# Patient Record
Sex: FEMALE | Race: WHITE | NOT HISPANIC OR LATINO
[De-identification: names, ages, dates, MRNs, and addresses within clinical notes are randomized per-mention and may not be internally consistent; named-entity substitution may affect disease eponyms.]

---

## 2017-01-24 ENCOUNTER — APPOINTMENT (OUTPATIENT)
Dept: OTOLARYNGOLOGY | Facility: CLINIC | Age: 27
End: 2017-01-24

## 2017-01-30 ENCOUNTER — OUTPATIENT (OUTPATIENT)
Dept: OUTPATIENT SERVICES | Facility: HOSPITAL | Age: 27
LOS: 1 days | End: 2017-01-30

## 2017-01-30 VITALS
OXYGEN SATURATION: 98 % | DIASTOLIC BLOOD PRESSURE: 70 MMHG | TEMPERATURE: 99 F | RESPIRATION RATE: 16 BRPM | HEIGHT: 62 IN | SYSTOLIC BLOOD PRESSURE: 112 MMHG | WEIGHT: 173.94 LBS | HEART RATE: 68 BPM

## 2017-01-30 DIAGNOSIS — J35.01 CHRONIC TONSILLITIS: ICD-10-CM

## 2017-01-30 DIAGNOSIS — Z98.890 OTHER SPECIFIED POSTPROCEDURAL STATES: Chronic | ICD-10-CM

## 2017-01-30 LAB
APTT BLD: 31.2 SEC — SIGNIFICANT CHANGE UP (ref 27.5–37.4)
BUN SERPL-MCNC: 8 MG/DL — SIGNIFICANT CHANGE UP (ref 7–23)
CALCIUM SERPL-MCNC: 9.3 MG/DL — SIGNIFICANT CHANGE UP (ref 8.4–10.5)
CHLORIDE SERPL-SCNC: 103 MMOL/L — SIGNIFICANT CHANGE UP (ref 98–107)
CO2 SERPL-SCNC: 24 MMOL/L — SIGNIFICANT CHANGE UP (ref 22–31)
CREAT SERPL-MCNC: 0.78 MG/DL — SIGNIFICANT CHANGE UP (ref 0.5–1.3)
GLUCOSE SERPL-MCNC: 75 MG/DL — SIGNIFICANT CHANGE UP (ref 70–99)
HCT VFR BLD CALC: 39.6 % — SIGNIFICANT CHANGE UP (ref 34.5–45)
HGB BLD-MCNC: 13.1 G/DL — SIGNIFICANT CHANGE UP (ref 11.5–15.5)
INR BLD: 1.02 — SIGNIFICANT CHANGE UP (ref 0.87–1.18)
MCHC RBC-ENTMCNC: 27.6 PG — SIGNIFICANT CHANGE UP (ref 27–34)
MCHC RBC-ENTMCNC: 33.1 % — SIGNIFICANT CHANGE UP (ref 32–36)
MCV RBC AUTO: 83.5 FL — SIGNIFICANT CHANGE UP (ref 80–100)
PLATELET # BLD AUTO: 278 K/UL — SIGNIFICANT CHANGE UP (ref 150–400)
PMV BLD: 11.1 FL — SIGNIFICANT CHANGE UP (ref 7–13)
POTASSIUM SERPL-MCNC: 4.2 MMOL/L — SIGNIFICANT CHANGE UP (ref 3.5–5.3)
POTASSIUM SERPL-SCNC: 4.2 MMOL/L — SIGNIFICANT CHANGE UP (ref 3.5–5.3)
PROTHROM AB SERPL-ACNC: 11.6 SEC — SIGNIFICANT CHANGE UP (ref 10–13.1)
RBC # BLD: 4.74 M/UL — SIGNIFICANT CHANGE UP (ref 3.8–5.2)
RBC # FLD: 13.3 % — SIGNIFICANT CHANGE UP (ref 10.3–14.5)
SODIUM SERPL-SCNC: 141 MMOL/L — SIGNIFICANT CHANGE UP (ref 135–145)
WBC # BLD: 8.74 K/UL — SIGNIFICANT CHANGE UP (ref 3.8–10.5)
WBC # FLD AUTO: 8.74 K/UL — SIGNIFICANT CHANGE UP (ref 3.8–10.5)

## 2017-01-30 NOTE — H&P PST ADULT - HISTORY OF PRESENT ILLNESS
Pt is a 26 y.o. female ; pt reports frequent strept infections [ 7-8 x 12 months] ; last 2  weeks ago ; pt to surgeon ; pt now presents for Tonsillectomy

## 2017-01-30 NOTE — H&P PST ADULT - PROBLEM SELECTOR PLAN 1
Tonsillectomy    Pre op instructions including pepcid given to pt;  pt appears to have a good understanding of pre op instructions  Cup provided for UCG dos    Pt to  DR Nolasco for pre op m/c

## 2017-01-30 NOTE — H&P PST ADULT - NSANTHOSAYNRD_GEN_A_CORE
No. JAYLENE screening performed.  STOP BANG Legend: 0-2 = LOW Risk; 3-4 = INTERMEDIATE Risk; 5-8 = HIGH Risk

## 2017-01-30 NOTE — H&P PST ADULT - LYMPHATIC
supraclavicular L/supraclavicular R/anterior cervical R/anterior cervical L/posterior cervical L/posterior cervical R

## 2017-01-30 NOTE — H&P PST ADULT - RS GEN PE MLT RESP DETAILS PC
clear to auscultation bilaterally/airway patent/respirations non-labored/good air movement/breath sounds equal

## 2017-01-30 NOTE — H&P PST ADULT - PMH
Easy bruising    Mononucleosis  2016  Obesity    Throat infection  last 2 weeks ago Easy bruising    Mononucleosis  2016  Obesity  borderline  Throat infection  last 2 weeks ago

## 2017-02-02 ENCOUNTER — RESULT REVIEW (OUTPATIENT)
Age: 27
End: 2017-02-02

## 2017-02-03 ENCOUNTER — OUTPATIENT (OUTPATIENT)
Dept: OUTPATIENT SERVICES | Facility: HOSPITAL | Age: 27
LOS: 1 days | Discharge: HOME | End: 2017-02-03
Payer: COMMERCIAL

## 2017-02-03 ENCOUNTER — TRANSCRIPTION ENCOUNTER (OUTPATIENT)
Age: 27
End: 2017-02-03

## 2017-02-03 VITALS
TEMPERATURE: 98 F | WEIGHT: 173.06 LBS | SYSTOLIC BLOOD PRESSURE: 133 MMHG | RESPIRATION RATE: 14 BRPM | OXYGEN SATURATION: 99 % | HEART RATE: 68 BPM | HEIGHT: 64 IN | DIASTOLIC BLOOD PRESSURE: 78 MMHG

## 2017-02-03 VITALS
SYSTOLIC BLOOD PRESSURE: 145 MMHG | HEART RATE: 70 BPM | TEMPERATURE: 98 F | OXYGEN SATURATION: 97 % | RESPIRATION RATE: 14 BRPM | DIASTOLIC BLOOD PRESSURE: 83 MMHG

## 2017-02-03 DIAGNOSIS — J35.01 CHRONIC TONSILLITIS: ICD-10-CM

## 2017-02-03 DIAGNOSIS — Z98.890 OTHER SPECIFIED POSTPROCEDURAL STATES: Chronic | ICD-10-CM

## 2017-02-03 PROCEDURE — 88304 TISSUE EXAM BY PATHOLOGIST: CPT | Mod: 26

## 2017-02-03 RX ORDER — OXYMETAZOLINE HYDROCHLORIDE 0.5 MG/ML
2 SPRAY NASAL
Qty: 0 | Refills: 0 | COMMUNITY

## 2017-02-03 RX ORDER — SODIUM CHLORIDE 9 MG/ML
1000 INJECTION, SOLUTION INTRAVENOUS
Qty: 0 | Refills: 0 | Status: CANCELLED | OUTPATIENT
Start: 2017-02-06 | End: 2017-02-04

## 2017-02-03 RX ORDER — SODIUM CHLORIDE 9 MG/ML
1000 INJECTION, SOLUTION INTRAVENOUS
Qty: 0 | Refills: 0 | Status: DISCONTINUED | OUTPATIENT
Start: 2017-02-03 | End: 2017-02-04

## 2017-02-03 RX ADMIN — SODIUM CHLORIDE 75 MILLILITER(S): 9 INJECTION, SOLUTION INTRAVENOUS at 10:48

## 2017-02-03 NOTE — ASU DISCHARGE PLAN (ADULT/PEDIATRIC). - NURSING INSTRUCTIONS
You received intravenous tylenol in the operating room at 9am. You may take additional tylenol products after 3pm.

## 2017-02-03 NOTE — ASU DISCHARGE PLAN (ADULT/PEDIATRIC). - MEDICATION SUMMARY - MEDICATIONS TO TAKE
I will START or STAY ON the medications listed below when I get home from the hospital:    Lortab 5/325 oral tablet  -- 1 tab(s) by mouth every 6 hours, As Needed for moderate pain  -- Indication: For Chronic tonsillitis    clindamycin 300 mg oral capsule  -- 1 cap(s) by mouth every 6 hours cmpleted dose on 1/28/17  -- Indication: For Chronic tonsillitis    Afrin 0.05% nasal spray  -- 2 spray(s) into nose 2 times a day, As Needed for epistaxis and into nose congestion for up to 3 days  -- Indication: For Epistaxis (nosebleeds)

## 2017-02-03 NOTE — ASU DISCHARGE PLAN (ADULT/PEDIATRIC). - NOTIFY
Increased Irritability or Sluggishness/Excessive Diarrhea/Inability to Tolerate Liquids or Foods/Swelling that continues/Numbness, tingling/Fever greater than 101/Persistent Nausea and Vomiting/Bleeding that does not stop/Unable to Urinate Persistent Nausea and Vomiting/Inability to Tolerate Liquids or Foods/Pain not relieved by Medications/Numbness, tingling/Excessive Diarrhea/Swelling that continues/Fever greater than 101/Bleeding that does not stop/Unable to Urinate/Increased Irritability or Sluggishness

## 2017-02-03 NOTE — ASU DISCHARGE PLAN (ADULT/PEDIATRIC). - CONDITIONS AT DISCHARGE
Verbalizing good understanding of post op instructions and medication review. Discharge criteria met. Cleared for discharge by Anesthesia. Escorted for discharge.

## 2017-02-03 NOTE — ASU DISCHARGE PLAN (ADULT/PEDIATRIC). - INSTRUCTIONS
Clear liquids advancing to full tomorrow. Soft foods (things you can mash up with your tongue and soft foods) until follow-up in 2 weeks. Nothing red by mouth.

## 2017-02-07 LAB — SURGICAL PATHOLOGY STUDY: SIGNIFICANT CHANGE UP

## 2017-02-27 ENCOUNTER — TRANSCRIPTION ENCOUNTER (OUTPATIENT)
Age: 27
End: 2017-02-27

## 2017-04-24 ENCOUNTER — TRANSCRIPTION ENCOUNTER (OUTPATIENT)
Age: 27
End: 2017-04-24

## 2017-05-19 ENCOUNTER — OUTPATIENT (OUTPATIENT)
Dept: OUTPATIENT SERVICES | Facility: HOSPITAL | Age: 27
LOS: 1 days | End: 2017-05-19

## 2017-05-19 DIAGNOSIS — Z98.890 OTHER SPECIFIED POSTPROCEDURAL STATES: Chronic | ICD-10-CM

## 2017-06-20 ENCOUNTER — APPOINTMENT (OUTPATIENT)
Dept: DERMATOLOGY | Facility: CLINIC | Age: 27
End: 2017-06-20

## 2017-07-22 ENCOUNTER — APPOINTMENT (OUTPATIENT)
Dept: DERMATOLOGY | Facility: CLINIC | Age: 27
End: 2017-07-22

## 2017-09-29 ENCOUNTER — OUTPATIENT (OUTPATIENT)
Dept: OUTPATIENT SERVICES | Facility: HOSPITAL | Age: 27
LOS: 1 days | End: 2017-09-29

## 2017-09-29 DIAGNOSIS — Z98.890 OTHER SPECIFIED POSTPROCEDURAL STATES: Chronic | ICD-10-CM

## 2018-01-09 NOTE — ASU PREOP CHECKLIST - ADVANCE DIRECTIVE ADDRESSED/READDRESSED
done 21F w/ bipolar disorder, mild MCKAYLA (no CPAP), & MO who presents for LSG.     -Pain/nausea control  -BCLD, IVF  -Protonix  -CBC 6hrs post-op   -ASA POD1  -SCDs, OOB/A, IS  -AM labs  -Nutritional consult in AM

## 2018-02-01 ENCOUNTER — OUTPATIENT (OUTPATIENT)
Dept: OUTPATIENT SERVICES | Facility: HOSPITAL | Age: 28
LOS: 1 days | End: 2018-02-01

## 2018-02-01 DIAGNOSIS — Z98.890 OTHER SPECIFIED POSTPROCEDURAL STATES: Chronic | ICD-10-CM

## 2018-03-01 ENCOUNTER — APPOINTMENT (OUTPATIENT)
Dept: ORTHOPEDIC SURGERY | Facility: CLINIC | Age: 28
End: 2018-03-01

## 2018-03-19 ENCOUNTER — TRANSCRIPTION ENCOUNTER (OUTPATIENT)
Age: 28
End: 2018-03-19

## 2018-05-01 ENCOUNTER — APPOINTMENT (OUTPATIENT)
Dept: DERMATOLOGY | Facility: CLINIC | Age: 28
End: 2018-05-01

## 2018-07-16 PROBLEM — E66.9 OBESITY, UNSPECIFIED: Chronic | Status: ACTIVE | Noted: 2017-01-30

## 2018-07-23 ENCOUNTER — APPOINTMENT (OUTPATIENT)
Dept: ORTHOPEDIC SURGERY | Facility: CLINIC | Age: 28
End: 2018-07-23
Payer: COMMERCIAL

## 2018-07-23 VITALS
WEIGHT: 140 LBS | SYSTOLIC BLOOD PRESSURE: 157 MMHG | HEIGHT: 62 IN | DIASTOLIC BLOOD PRESSURE: 91 MMHG | HEART RATE: 71 BPM | BODY MASS INDEX: 25.76 KG/M2

## 2018-07-23 DIAGNOSIS — Z83.3 FAMILY HISTORY OF DIABETES MELLITUS: ICD-10-CM

## 2018-07-23 DIAGNOSIS — Z82.69 FAMILY HISTORY OF OTHER DISEASES OF THE MUSCULOSKELETAL SYSTEM AND CONNECTIVE TISSUE: ICD-10-CM

## 2018-07-23 DIAGNOSIS — M77.8 OTHER ENTHESOPATHIES, NOT ELSEWHERE CLASSIFIED: ICD-10-CM

## 2018-07-23 PROCEDURE — 73140 X-RAY EXAM OF FINGER(S): CPT | Mod: 26,F5

## 2018-07-23 PROCEDURE — 99203 OFFICE O/P NEW LOW 30 MIN: CPT

## 2018-07-23 RX ORDER — CHLORHEXIDINE GLUCONATE 4 %
250 LIQUID (ML) TOPICAL
Refills: 0 | Status: ACTIVE | COMMUNITY

## 2018-09-07 ENCOUNTER — OUTPATIENT (OUTPATIENT)
Dept: OUTPATIENT SERVICES | Facility: HOSPITAL | Age: 28
LOS: 1 days | End: 2018-09-07

## 2018-09-07 DIAGNOSIS — Z98.890 OTHER SPECIFIED POSTPROCEDURAL STATES: Chronic | ICD-10-CM

## 2018-09-11 PROBLEM — B27.90 INFECTIOUS MONONUCLEOSIS, UNSPECIFIED WITHOUT COMPLICATION: Chronic | Status: ACTIVE | Noted: 2017-01-30

## 2018-09-11 PROBLEM — J02.9 ACUTE PHARYNGITIS, UNSPECIFIED: Chronic | Status: ACTIVE | Noted: 2017-01-30

## 2018-09-11 PROBLEM — R23.8 OTHER SKIN CHANGES: Chronic | Status: ACTIVE | Noted: 2017-01-30

## 2018-10-09 ENCOUNTER — APPOINTMENT (OUTPATIENT)
Dept: DERMATOLOGY | Facility: CLINIC | Age: 28
End: 2018-10-09
Payer: COMMERCIAL

## 2018-10-09 PROCEDURE — 99214 OFFICE O/P EST MOD 30 MIN: CPT

## 2018-11-08 ENCOUNTER — APPOINTMENT (OUTPATIENT)
Dept: DERMATOLOGY | Facility: CLINIC | Age: 28
End: 2018-11-08

## 2019-01-24 ENCOUNTER — OUTPATIENT (OUTPATIENT)
Dept: OUTPATIENT SERVICES | Facility: HOSPITAL | Age: 29
LOS: 1 days | End: 2019-01-24

## 2019-01-24 DIAGNOSIS — Z98.890 OTHER SPECIFIED POSTPROCEDURAL STATES: Chronic | ICD-10-CM

## 2019-03-20 ENCOUNTER — TRANSCRIPTION ENCOUNTER (OUTPATIENT)
Age: 29
End: 2019-03-20

## 2019-06-05 ENCOUNTER — OUTPATIENT (OUTPATIENT)
Dept: OUTPATIENT SERVICES | Facility: HOSPITAL | Age: 29
LOS: 1 days | End: 2019-06-05

## 2019-06-05 DIAGNOSIS — Z98.890 OTHER SPECIFIED POSTPROCEDURAL STATES: Chronic | ICD-10-CM

## 2019-06-17 ENCOUNTER — TRANSCRIPTION ENCOUNTER (OUTPATIENT)
Age: 29
End: 2019-06-17

## 2019-10-14 ENCOUNTER — TRANSCRIPTION ENCOUNTER (OUTPATIENT)
Age: 29
End: 2019-10-14

## 2019-10-15 ENCOUNTER — APPOINTMENT (OUTPATIENT)
Dept: DERMATOLOGY | Facility: CLINIC | Age: 29
End: 2019-10-15

## 2019-11-01 ENCOUNTER — APPOINTMENT (OUTPATIENT)
Dept: DERMATOLOGY | Facility: CLINIC | Age: 29
End: 2019-11-01

## 2019-11-12 ENCOUNTER — OUTPATIENT (OUTPATIENT)
Dept: OUTPATIENT SERVICES | Facility: HOSPITAL | Age: 29
LOS: 1 days | End: 2019-11-12

## 2019-11-12 DIAGNOSIS — Z98.890 OTHER SPECIFIED POSTPROCEDURAL STATES: Chronic | ICD-10-CM

## 2019-11-27 ENCOUNTER — OUTPATIENT (OUTPATIENT)
Dept: OUTPATIENT SERVICES | Facility: HOSPITAL | Age: 29
LOS: 1 days | End: 2019-11-27

## 2019-11-27 DIAGNOSIS — Z98.890 OTHER SPECIFIED POSTPROCEDURAL STATES: Chronic | ICD-10-CM

## 2019-12-20 ENCOUNTER — APPOINTMENT (OUTPATIENT)
Dept: DERMATOLOGY | Facility: CLINIC | Age: 29
End: 2019-12-20
Payer: COMMERCIAL

## 2019-12-20 PROCEDURE — 11900 INJECT SKIN LESIONS </W 7: CPT

## 2019-12-20 PROCEDURE — 99214 OFFICE O/P EST MOD 30 MIN: CPT | Mod: 25

## 2020-01-07 ENCOUNTER — TRANSCRIPTION ENCOUNTER (OUTPATIENT)
Age: 30
End: 2020-01-07

## 2020-02-03 ENCOUNTER — OUTPATIENT (OUTPATIENT)
Dept: OUTPATIENT SERVICES | Facility: HOSPITAL | Age: 30
LOS: 1 days | End: 2020-02-03

## 2020-02-03 DIAGNOSIS — Z98.890 OTHER SPECIFIED POSTPROCEDURAL STATES: Chronic | ICD-10-CM

## 2020-02-16 ENCOUNTER — TRANSCRIPTION ENCOUNTER (OUTPATIENT)
Age: 30
End: 2020-02-16

## 2020-02-18 ENCOUNTER — TRANSCRIPTION ENCOUNTER (OUTPATIENT)
Age: 30
End: 2020-02-18

## 2020-03-19 ENCOUNTER — OUTPATIENT (OUTPATIENT)
Dept: OUTPATIENT SERVICES | Facility: HOSPITAL | Age: 30
LOS: 1 days | End: 2020-03-19

## 2020-03-19 DIAGNOSIS — Z98.890 OTHER SPECIFIED POSTPROCEDURAL STATES: Chronic | ICD-10-CM

## 2020-04-26 ENCOUNTER — MESSAGE (OUTPATIENT)
Age: 30
End: 2020-04-26

## 2020-04-29 LAB
SARS-COV-2 IGG SERPL IA-ACNC: 0 INDEX
SARS-COV-2 IGG SERPL QL IA: NEGATIVE

## 2020-07-30 ENCOUNTER — OUTPATIENT (OUTPATIENT)
Dept: OUTPATIENT SERVICES | Facility: HOSPITAL | Age: 30
LOS: 1 days | End: 2020-07-30

## 2020-07-30 DIAGNOSIS — Z98.890 OTHER SPECIFIED POSTPROCEDURAL STATES: Chronic | ICD-10-CM

## 2020-12-05 ENCOUNTER — OUTPATIENT (OUTPATIENT)
Dept: OUTPATIENT SERVICES | Facility: HOSPITAL | Age: 30
LOS: 1 days | End: 2020-12-05

## 2020-12-05 DIAGNOSIS — Z98.890 OTHER SPECIFIED POSTPROCEDURAL STATES: Chronic | ICD-10-CM

## 2020-12-16 ENCOUNTER — OUTPATIENT (OUTPATIENT)
Dept: OUTPATIENT SERVICES | Facility: HOSPITAL | Age: 30
LOS: 1 days | End: 2020-12-16
Payer: COMMERCIAL

## 2020-12-16 ENCOUNTER — APPOINTMENT (OUTPATIENT)
Dept: MRI IMAGING | Facility: CLINIC | Age: 30
End: 2020-12-16
Payer: COMMERCIAL

## 2020-12-16 DIAGNOSIS — Z00.8 ENCOUNTER FOR OTHER GENERAL EXAMINATION: ICD-10-CM

## 2020-12-16 DIAGNOSIS — R19.00 INTRA-ABDOMINAL AND PELVIC SWELLING, MASS AND LUMP, UNSPECIFIED SITE: ICD-10-CM

## 2020-12-16 DIAGNOSIS — Z98.890 OTHER SPECIFIED POSTPROCEDURAL STATES: Chronic | ICD-10-CM

## 2020-12-16 PROCEDURE — A9585: CPT

## 2020-12-16 PROCEDURE — 72197 MRI PELVIS W/O & W/DYE: CPT

## 2020-12-16 PROCEDURE — 72197 MRI PELVIS W/O & W/DYE: CPT | Mod: 26

## 2020-12-22 ENCOUNTER — APPOINTMENT (OUTPATIENT)
Dept: DERMATOLOGY | Facility: CLINIC | Age: 30
End: 2020-12-22
Payer: COMMERCIAL

## 2020-12-22 PROCEDURE — 99214 OFFICE O/P EST MOD 30 MIN: CPT

## 2020-12-22 PROCEDURE — 99072 ADDL SUPL MATRL&STAF TM PHE: CPT

## 2021-01-26 DIAGNOSIS — E03.9 HYPOTHYROIDISM, UNSPECIFIED: ICD-10-CM

## 2021-01-26 PROBLEM — Z82.49 FAMILY HISTORY OF HYPERTENSION: Status: ACTIVE | Noted: 2021-01-26

## 2021-01-26 PROBLEM — Z82.49 FAMILY HISTORY OF CARDIAC DISORDER: Status: ACTIVE | Noted: 2021-01-26

## 2021-01-26 PROBLEM — Z80.9 FAMILY HISTORY OF MALIGNANT NEOPLASM: Status: ACTIVE | Noted: 2018-07-23

## 2021-01-26 RX ORDER — ASCORBIC ACID 500 MG
TABLET ORAL
Refills: 0 | Status: ACTIVE | COMMUNITY

## 2021-01-26 RX ORDER — COLD-HOT PACK
EACH MISCELLANEOUS
Refills: 0 | Status: ACTIVE | COMMUNITY

## 2021-01-26 RX ORDER — LEVOTHYROXINE SODIUM 137 UG/1
TABLET ORAL
Refills: 0 | Status: ACTIVE | COMMUNITY

## 2021-01-26 RX ORDER — MULTIVITAMIN
TABLET ORAL
Refills: 0 | Status: ACTIVE | COMMUNITY

## 2021-01-27 ENCOUNTER — APPOINTMENT (OUTPATIENT)
Dept: GYNECOLOGIC ONCOLOGY | Facility: CLINIC | Age: 31
End: 2021-01-27
Payer: COMMERCIAL

## 2021-01-27 ENCOUNTER — NON-APPOINTMENT (OUTPATIENT)
Age: 31
End: 2021-01-27

## 2021-01-27 VITALS
WEIGHT: 150 LBS | HEIGHT: 62 IN | DIASTOLIC BLOOD PRESSURE: 90 MMHG | HEART RATE: 71 BPM | OXYGEN SATURATION: 100 % | BODY MASS INDEX: 27.6 KG/M2 | RESPIRATION RATE: 16 BRPM | SYSTOLIC BLOOD PRESSURE: 137 MMHG

## 2021-01-27 DIAGNOSIS — Z82.49 FAMILY HISTORY OF ISCHEMIC HEART DISEASE AND OTHER DISEASES OF THE CIRCULATORY SYSTEM: ICD-10-CM

## 2021-01-27 DIAGNOSIS — Z80.9 FAMILY HISTORY OF MALIGNANT NEOPLASM, UNSPECIFIED: ICD-10-CM

## 2021-01-27 PROCEDURE — 99072 ADDL SUPL MATRL&STAF TM PHE: CPT

## 2021-01-27 PROCEDURE — 93976 VASCULAR STUDY: CPT | Mod: 59

## 2021-01-27 PROCEDURE — 76830 TRANSVAGINAL US NON-OB: CPT | Mod: 59

## 2021-01-27 PROCEDURE — 76857 US EXAM PELVIC LIMITED: CPT | Mod: 59

## 2021-01-27 PROCEDURE — 99204 OFFICE O/P NEW MOD 45 MIN: CPT | Mod: 25

## 2021-01-28 NOTE — PHYSICAL EXAM
[Normal] : Bimanual Exam: Normal [de-identified] : RV uterus [de-identified] : Patient was interviewed and examined with chaperone present. Name of Chaperone: Nori Pettit PA-C

## 2021-01-28 NOTE — CHIEF COMPLAINT
[FreeTextEntry1] : [place] Office\par \par [facility] [phone] at [address] [place] [address]

## 2021-01-28 NOTE — HISTORY OF PRESENT ILLNESS
[FreeTextEntry1] : 29 y/o  female, LMP 20 being referred by Dr. Cedillo for evaluation of fibroid and AUB. Patient reports she noted over the past 5-6 years she had been experiencing heavy vaginal bleeding with her cycles, with passing of clots lasting 3-4 days. US was done which she reports revealed a possible mass which prompted work up with MRI. 20 MRI revealed a 4cm left adnexal mass is likely a broad ligament fibroid. Additional small posterior uterine body fibroid measuring 1 cm. 4mm focus of low T2 signal in the endometrium in the region of  the fundus, possibly a polyp. She reports she was recommended repeat US in 6 months for further evaluation of growth and would like a second opinion. She reports a history of an ectopic pregnancy 5-6 years ago treated with methotrexate. Denies vaginal discharge, abdominal pain/bloating/distension, pelvis discomfort, changes in normal bowel/urinary habits, nausea/vomiting, unintentional weight loss/gain, and all other associated signs and symptoms. \par \par Health Maintenance:\par LPap: 2020; normal. As per patient she had a recent history of abnormal PAP with colposcopy but PAP following it was normal.\par She reports history of colonoscopy in the past due to father with dx of small intestine cancer and crohn's disease; normal.

## 2021-01-28 NOTE — END OF VISIT
[FreeTextEntry3] : This note accurately reflects the work and decisions made by me.\par Written by Nori Pettit PA-C acting as a scribe for Dr. Shawn Moy.

## 2021-01-28 NOTE — ASSESSMENT
[FreeTextEntry1] : 30 year old with likely left adnexal broad ligament fibroid, endometrial polyp and menorrhagia. Discussed with patient my recommendations of either conservative management with repeat US in 2 months vs D&C, hysteroscopy possible myosure vs robotic excision of fibroid, D&C. Pt would prefer conservative management with a follow up pelvic US in 2 months. She understands if there is growth of this fibroid or any worsening imaging, she will need surgery. Pt understands the limitations of imaging and the possibility that a malignancy can be missed. However, I doubt these findings represent a malignancy and I am in favor with repeating her US in 2 months.

## 2021-04-28 PROBLEM — N84.0 ENDOMETRIAL POLYP: Status: ACTIVE | Noted: 2021-01-26

## 2021-04-30 ENCOUNTER — APPOINTMENT (OUTPATIENT)
Dept: GYNECOLOGIC ONCOLOGY | Facility: CLINIC | Age: 31
End: 2021-04-30
Payer: COMMERCIAL

## 2021-04-30 VITALS — OXYGEN SATURATION: 100 % | HEART RATE: 73 BPM | DIASTOLIC BLOOD PRESSURE: 89 MMHG | SYSTOLIC BLOOD PRESSURE: 136 MMHG

## 2021-04-30 DIAGNOSIS — N84.0 POLYP OF CORPUS UTERI: ICD-10-CM

## 2021-04-30 PROCEDURE — 99212 OFFICE O/P EST SF 10 MIN: CPT | Mod: 25

## 2021-04-30 PROCEDURE — 76857 US EXAM PELVIC LIMITED: CPT | Mod: 59

## 2021-04-30 PROCEDURE — 99072 ADDL SUPL MATRL&STAF TM PHE: CPT

## 2021-04-30 PROCEDURE — 76830 TRANSVAGINAL US NON-OB: CPT | Mod: 59

## 2021-05-02 NOTE — END OF VISIT
[FreeTextEntry3] : Written by Alejandra Salinas, acting as a scribe for Dr. Shawn Moy \par This note accurately reflects the work and decisions made by me.

## 2021-05-02 NOTE — ASSESSMENT
[FreeTextEntry1] : Ultrasound performed in office today revealed uterine measurement 6.9 x 4.8 x 3.8 cm. Endometrial thickness 6.3mm. Retro uterus. Fibroid 1.0 x 1.1 x 1.1 cm. Right ovary WNL 4.1 x 2.7 x 2.3cm. Left ovary WNL 3.8 x 3.3 x 2.0cm. LAD broad ligament fibroid 3.0 x 3.5 x 3.3 cm.   \par \par I discussed with patient that her ultrasound today remains stable and surgical intervention is not warranted at this time. Patient understands that if she wishes to have children in the future it warrants to have a consultation with [facility] specialist to see if the location of the fibroid is okay for patient to get pregnant otherwise surgical intervention would be warranted prior to patient trying to conceive. Otherwise patient is doing well from a gynecological stand point and will return in 2 months for a follow up ultrasound. Patient stated she understood and agreed to comply.

## 2021-05-02 NOTE — HISTORY OF PRESENT ILLNESS
[FreeTextEntry1] : This 30 year old was referred by Dr. Cedillo for evaluation of fibroids and AUB in January 2021. 12/16/20 MRI revealed a 4cm left adnexal mass is likely a broad ligament fibroid. Additional small posterior uterine body fibroid measuring 1 cm. 4mm focus of low T2 signal in the endometrium in the region of the fundus, possibly a polyp. Ultrasound performed in my office on 1/27/21 revealed UT was 7.1 Endometrial lining measured 9.7 mm. CX was 3.2 Firb 1.0cm, RTO with hemorrhagic cyst 2.4 x 1.5 x 2.3 cm. RT RI .45. Left ovary WNL. LT paraovarian cyst .8 x .6 x .7cm. LEFT adnexal vascular mass 2.9 x 3.4 x 3.0 cm with RI .55. I discussed with patient my recommendation of conservative management with a repeat ultrasound in 2 months vs a D&C , hysteroscopy possible mysure vs robotic excision of fibroid, D&C. Patient preferred follow up ultrasound in 2 months with the understanding the limitations of imaging and the possibility that a malignancy can be missed. However I did not believe findings represented a malignancy so I was in favor of conservative management. Patient understood that if there was growth of this fibroid or any worsening imaging she will need surgery. Patient returns to the office today for a follow up ultrasound. Patient denies any pain or abnormal VB. Patient states she feels well from a gynecological stand point.

## 2021-05-02 NOTE — REASON FOR VISIT
[FreeTextEntry1] : [place] Location \par \par [facility] [place]. [phone]\par [address] [place] [address]

## 2021-05-02 NOTE — PHYSICAL EXAM
[Normal] : No focal neurologic defects observed [de-identified] : Alejandra Salinas Medical assistant chaperoned during results and discussion.

## 2021-05-25 ENCOUNTER — OUTPATIENT (OUTPATIENT)
Dept: OUTPATIENT SERVICES | Facility: HOSPITAL | Age: 31
LOS: 1 days | End: 2021-05-25

## 2021-05-25 DIAGNOSIS — Z98.890 OTHER SPECIFIED POSTPROCEDURAL STATES: Chronic | ICD-10-CM

## 2021-09-23 ENCOUNTER — APPOINTMENT (OUTPATIENT)
Dept: GYNECOLOGIC ONCOLOGY | Facility: CLINIC | Age: 31
End: 2021-09-23

## 2021-09-30 ENCOUNTER — APPOINTMENT (OUTPATIENT)
Dept: GYNECOLOGIC ONCOLOGY | Facility: CLINIC | Age: 31
End: 2021-09-30

## 2021-09-30 ENCOUNTER — APPOINTMENT (OUTPATIENT)
Dept: GYNECOLOGIC ONCOLOGY | Facility: CLINIC | Age: 31
End: 2021-09-30
Payer: COMMERCIAL

## 2021-09-30 VITALS
HEART RATE: 63 BPM | SYSTOLIC BLOOD PRESSURE: 136 MMHG | BODY MASS INDEX: 23.92 KG/M2 | RESPIRATION RATE: 16 BRPM | WEIGHT: 130 LBS | HEIGHT: 62 IN | OXYGEN SATURATION: 99 % | DIASTOLIC BLOOD PRESSURE: 89 MMHG

## 2021-09-30 PROCEDURE — 93976 VASCULAR STUDY: CPT | Mod: 59

## 2021-09-30 PROCEDURE — 99212 OFFICE O/P EST SF 10 MIN: CPT | Mod: 25

## 2021-09-30 PROCEDURE — 76830 TRANSVAGINAL US NON-OB: CPT | Mod: 59

## 2021-09-30 PROCEDURE — 76857 US EXAM PELVIC LIMITED: CPT | Mod: 59

## 2021-10-06 NOTE — HISTORY OF PRESENT ILLNESS
[FreeTextEntry1] : This 31 year old w/ fibroids and AUB was last seen in April 2021 for a follow up ultrasound which revealed uterine measurement 6.9 x 4.8 x 3.8 cm. Endometrial thickness 6.3mm. Retro uterus. Fibroid 1.0 x 1.1 x 1.1 cm. Right ovary WNL 4.1 x 2.7 x 2.3cm. Left ovary WNL 3.8 x 3.3 x 2.0cm. LAD broad ligament fibroid 3.0 x 3.5 x 3.3 cm. I discussed with patient stable ultrasound findings and that surgical intervention was not warranted at the time. I recommended patient consult with [facility] if she wished future child bearing to discuss and evaluate if the location of her fibroid was okay for patient to get pregnant and if surgical intervention would be warranted prior to trying to conceive. Patient returns to the office today for an overdue ultrasound. Patient denies any pain or VB. Patient states she feels well from a gynecological stand point.

## 2021-10-06 NOTE — ASSESSMENT
[FreeTextEntry1] : Ultrasound performed in office today revealed: Stable findings. \par \par I discussed with patient that her ultrasound today remains stable. In light of stable findings I am discharging the patient back to her primary gynecologist to resume routine gynecological care. Patient understands that if she develops any symptoms or concerns she may return to see me at any time. Patient stated she understood and agreed to comply.

## 2021-10-06 NOTE — REASON FOR VISIT
[FreeTextEntry1] : [place] Location \par \par [facility] [place]. [phone]\par [address] [place] [address]

## 2021-10-30 ENCOUNTER — EMERGENCY (EMERGENCY)
Facility: HOSPITAL | Age: 31
LOS: 1 days | End: 2021-10-30
Admitting: EMERGENCY MEDICINE
Payer: COMMERCIAL

## 2021-10-30 DIAGNOSIS — Z98.890 OTHER SPECIFIED POSTPROCEDURAL STATES: Chronic | ICD-10-CM

## 2021-10-30 PROCEDURE — 99284 EMERGENCY DEPT VISIT MOD MDM: CPT

## 2021-11-04 ENCOUNTER — APPOINTMENT (OUTPATIENT)
Dept: OBGYN | Facility: CLINIC | Age: 31
End: 2021-11-04
Payer: COMMERCIAL

## 2021-11-04 ENCOUNTER — NON-APPOINTMENT (OUTPATIENT)
Age: 31
End: 2021-11-04

## 2021-11-04 VITALS
SYSTOLIC BLOOD PRESSURE: 139 MMHG | WEIGHT: 135 LBS | DIASTOLIC BLOOD PRESSURE: 84 MMHG | HEIGHT: 62 IN | BODY MASS INDEX: 24.84 KG/M2

## 2021-11-04 DIAGNOSIS — Z11.3 ENCOUNTER FOR SCREENING FOR INFECTIONS WITH A PREDOMINANTLY SEXUAL MODE OF TRANSMISSION: ICD-10-CM

## 2021-11-04 PROCEDURE — 99385 PREV VISIT NEW AGE 18-39: CPT

## 2021-11-04 PROCEDURE — 99202 OFFICE O/P NEW SF 15 MIN: CPT | Mod: 25

## 2021-11-04 NOTE — PHYSICAL EXAM
[Appropriately responsive] : appropriately responsive [Alert] : alert [No Acute Distress] : no acute distress [Soft] : soft [Non-tender] : non-tender [Non-distended] : non-distended [Oriented x3] : oriented x3 [Examination Of The Breasts] : a normal appearance [No Discharge] : no discharge [No Masses] : no breast masses were palpable [Normal] : normal [Tenderness] : nontender [Uterine Adnexae] : normal

## 2021-11-04 NOTE — HISTORY OF PRESENT ILLNESS
[FreeTextEntry1] : 32yo  female LMP 10/17 presents for annual GYN visit\par New patient\par Patient denies any GYN complaints.\par Asks for STD screening last partner unfaithful\par Brought 2020 Pelvic MRI with 4 cm left broad lig fiboroid. Dr. maldonado ordered TVUS last month with nogrowth\par \par Menstrual Cycle:monthly, moderate\par Sexual Activity:yes, condoms\par \par ROS: Denies fever/chills, HA, Cough/sore throat, CP, SOB, N/V, Diarrhea/Constipation, Pelvic pain, Urinary frequency/ urgency/ Incontinence, irregular vaginal bleeding, discharge or irritation\par \par Medical History\par OBhx:ectopic tx with MTX\par GYNhx 1 anb pap with colposcopy 2 years ago\par PMH:hypothyroid\par PSH:denies\par Meds:levothyroxine\par ALL:PCN \par Social:social etoh\par Famhx:denies\par \par Preventative\par PCP:yearly\par Physical Activity:yes\par Diet:healthy\par Vitamins:yes\par \par \par

## 2021-11-04 NOTE — PLAN
[FreeTextEntry1] : Annual:all exams and orders as above\par STD screenings: swabs and bloods done\par GYN counseling: Patient instructed to call for Unusual Pelvic pain,  UTI symptoms, irregular vaginal bleeding/discharge- Patient verbalized agreement\par F/U: patient to follow up in one year for annual GYN exam unless otherwise needed\par

## 2021-11-05 LAB — HIV1+2 AB SPEC QL IA.RAPID: NONREACTIVE

## 2021-11-08 ENCOUNTER — NON-APPOINTMENT (OUTPATIENT)
Age: 31
End: 2021-11-08

## 2021-11-08 DIAGNOSIS — N76.0 ACUTE VAGINITIS: ICD-10-CM

## 2021-11-08 DIAGNOSIS — B96.89 ACUTE VAGINITIS: ICD-10-CM

## 2021-11-08 LAB
C TRACH RRNA SPEC QL NAA+PROBE: NOT DETECTED
CANDIDA VAG CYTO: NOT DETECTED
G VAGINALIS+PREV SP MTYP VAG QL MICRO: DETECTED
HBV SURFACE AG SER QL: NONREACTIVE
HCV AB SER QL: NONREACTIVE
HCV S/CO RATIO: 0.09 S/CO
HPV HIGH+LOW RISK DNA PNL CVX: DETECTED
N GONORRHOEA RRNA SPEC QL NAA+PROBE: NOT DETECTED
RPR SER-TITR: NORMAL
SOURCE TP AMPLIFICATION: NORMAL
T VAGINALIS VAG QL WET PREP: NOT DETECTED

## 2021-11-10 LAB — CYTOLOGY CVX/VAG DOC THIN PREP: ABNORMAL

## 2021-12-21 ENCOUNTER — APPOINTMENT (OUTPATIENT)
Dept: DERMATOLOGY | Facility: CLINIC | Age: 31
End: 2021-12-21
Payer: COMMERCIAL

## 2021-12-21 PROCEDURE — 99214 OFFICE O/P EST MOD 30 MIN: CPT

## 2022-01-15 ENCOUNTER — OUTPATIENT (OUTPATIENT)
Dept: OUTPATIENT SERVICES | Facility: HOSPITAL | Age: 32
LOS: 1 days | End: 2022-01-15

## 2022-01-15 DIAGNOSIS — Z98.890 OTHER SPECIFIED POSTPROCEDURAL STATES: Chronic | ICD-10-CM

## 2022-01-27 DIAGNOSIS — I10 ESSENTIAL (PRIMARY) HYPERTENSION: ICD-10-CM

## 2022-03-09 ENCOUNTER — TRANSCRIPTION ENCOUNTER (OUTPATIENT)
Age: 32
End: 2022-03-09

## 2022-06-01 ENCOUNTER — OUTPATIENT (OUTPATIENT)
Dept: OUTPATIENT SERVICES | Facility: HOSPITAL | Age: 32
LOS: 1 days | End: 2022-06-01

## 2022-06-01 DIAGNOSIS — Z00.00 ENCOUNTER FOR GENERAL ADULT MEDICAL EXAMINATION WITHOUT ABNORMAL FINDINGS: ICD-10-CM

## 2022-06-01 DIAGNOSIS — Z98.890 OTHER SPECIFIED POSTPROCEDURAL STATES: Chronic | ICD-10-CM

## 2022-07-14 ENCOUNTER — OUTPATIENT (OUTPATIENT)
Dept: OUTPATIENT SERVICES | Facility: HOSPITAL | Age: 32
LOS: 1 days | End: 2022-07-14

## 2022-07-14 DIAGNOSIS — Z98.890 OTHER SPECIFIED POSTPROCEDURAL STATES: Chronic | ICD-10-CM

## 2022-07-14 DIAGNOSIS — E03.9 HYPOTHYROIDISM, UNSPECIFIED: ICD-10-CM

## 2022-07-14 DIAGNOSIS — D50.9 IRON DEFICIENCY ANEMIA, UNSPECIFIED: ICD-10-CM

## 2022-07-14 DIAGNOSIS — D51.8 OTHER VITAMIN B12 DEFICIENCY ANEMIAS: ICD-10-CM

## 2022-07-14 DIAGNOSIS — R53.83 OTHER FATIGUE: ICD-10-CM

## 2022-09-13 ENCOUNTER — APPOINTMENT (OUTPATIENT)
Dept: DERMATOLOGY | Facility: CLINIC | Age: 32
End: 2022-09-13

## 2022-09-23 ENCOUNTER — NON-APPOINTMENT (OUTPATIENT)
Age: 32
End: 2022-09-23

## 2022-12-20 ENCOUNTER — APPOINTMENT (OUTPATIENT)
Dept: DERMATOLOGY | Facility: CLINIC | Age: 32
End: 2022-12-20

## 2022-12-20 PROCEDURE — 99214 OFFICE O/P EST MOD 30 MIN: CPT

## 2023-01-03 ENCOUNTER — APPOINTMENT (OUTPATIENT)
Dept: OBGYN | Facility: CLINIC | Age: 33
End: 2023-01-03
Payer: COMMERCIAL

## 2023-01-03 VITALS
BODY MASS INDEX: 23.92 KG/M2 | WEIGHT: 130 LBS | SYSTOLIC BLOOD PRESSURE: 125 MMHG | HEIGHT: 62 IN | DIASTOLIC BLOOD PRESSURE: 83 MMHG

## 2023-01-03 DIAGNOSIS — Z01.419 ENCOUNTER FOR GYNECOLOGICAL EXAMINATION (GENERAL) (ROUTINE) W/OUT ABNORMAL FINDINGS: ICD-10-CM

## 2023-01-03 PROCEDURE — 99395 PREV VISIT EST AGE 18-39: CPT

## 2023-01-03 NOTE — HISTORY OF PRESENT ILLNESS
[FreeTextEntry1] : 33yo  female presents for annual GYN visit\par Patient denies any GYN complaints \par \par Menstrual Cycle:monthly, heavy, no tx desired\par Sexual Activity:yes, one male\par Contraception: none\par STD testing:none\par \par \par ROS: Denies fever/chills, HA, Cough/sore throat, CP, SOB, N/V, Diarrhea/Constipation, Pelvic pain, Urinary frequency/ urgency/ Incontinence, irregular vaginal bleeding, discharge or irritation\par \par New Medica Dx.or Sx since last visit:none\par \par Preventative\par PCP:yearly\par Physical Activity:yes\par Diet:moderately healthy\par \par

## 2023-01-03 NOTE — PLAN
[FreeTextEntry1] : Annual: exam and orders as above, pap done\par \par GYN counseling: Patient instructed to call for Unusual Pelvic pain,  UTI symptoms, irregular vaginal bleeding/discharge- Patient verbalized agreement\par \par F/U: patient to follow up in one year for annual GYN exam unless otherwise needed\par \par \par

## 2023-01-03 NOTE — PHYSICAL EXAM
[Chaperone Present] : A chaperone was present in the examining room during all aspects of the physical examination [Appropriately responsive] : appropriately responsive [Alert] : alert [No Acute Distress] : no acute distress [Soft] : soft [Non-tender] : non-tender [Non-distended] : non-distended [Oriented x3] : oriented x3 [Examination Of The Breasts] : a normal appearance [No Discharge] : no discharge [No Masses] : no breast masses were palpable [Normal] : normal [Tenderness] : nontender [Uterine Adnexae] : normal

## 2023-01-05 LAB — HPV HIGH+LOW RISK DNA PNL CVX: NOT DETECTED

## 2023-01-10 LAB — CYTOLOGY CVX/VAG DOC THIN PREP: NORMAL

## 2023-04-13 ENCOUNTER — APPOINTMENT (OUTPATIENT)
Dept: DERMATOLOGY | Facility: CLINIC | Age: 33
End: 2023-04-13

## 2023-04-19 ENCOUNTER — NON-APPOINTMENT (OUTPATIENT)
Age: 33
End: 2023-04-19

## 2023-07-07 ENCOUNTER — APPOINTMENT (OUTPATIENT)
Dept: MRI IMAGING | Facility: CLINIC | Age: 33
End: 2023-07-07
Payer: COMMERCIAL

## 2023-07-07 PROCEDURE — 73721 MRI JNT OF LWR EXTRE W/O DYE: CPT | Mod: RT

## 2023-07-13 ENCOUNTER — NON-APPOINTMENT (OUTPATIENT)
Age: 33
End: 2023-07-13

## 2023-07-22 ENCOUNTER — NON-APPOINTMENT (OUTPATIENT)
Age: 33
End: 2023-07-22

## 2023-08-03 ENCOUNTER — APPOINTMENT (OUTPATIENT)
Dept: OBGYN | Facility: CLINIC | Age: 33
End: 2023-08-03

## 2023-08-10 NOTE — ASU PREOP CHECKLIST - HAIR REMOVAL
Head,  normocephalic,  atraumatic,  Face,  Face within normal limits,  Ears,  External ears within normal limits,  Nose/Nasopharynx,  External nose  normal appearance,  nares patent,  no nasal discharge,  Mouth and Throat,  Oral cavity appearance normal, Lips,  Appearance normal
hair removal not indicated

## 2023-08-11 ENCOUNTER — APPOINTMENT (OUTPATIENT)
Dept: DERMATOLOGY | Facility: CLINIC | Age: 33
End: 2023-08-11

## 2023-09-21 ENCOUNTER — APPOINTMENT (OUTPATIENT)
Dept: DERMATOLOGY | Facility: CLINIC | Age: 33
End: 2023-09-21
Payer: COMMERCIAL

## 2023-09-21 PROCEDURE — 10060 I&D ABSCESS SIMPLE/SINGLE: CPT

## 2023-09-21 PROCEDURE — 99213 OFFICE O/P EST LOW 20 MIN: CPT | Mod: 25

## 2023-12-19 ENCOUNTER — APPOINTMENT (OUTPATIENT)
Dept: DERMATOLOGY | Facility: CLINIC | Age: 33
End: 2023-12-19
Payer: COMMERCIAL

## 2023-12-19 PROCEDURE — 99214 OFFICE O/P EST MOD 30 MIN: CPT

## 2023-12-21 ENCOUNTER — APPOINTMENT (OUTPATIENT)
Dept: OBGYN | Facility: CLINIC | Age: 33
End: 2023-12-21
Payer: COMMERCIAL

## 2023-12-21 VITALS — SYSTOLIC BLOOD PRESSURE: 142 MMHG | HEIGHT: 62 IN | DIASTOLIC BLOOD PRESSURE: 86 MMHG

## 2023-12-21 DIAGNOSIS — N93.8 OTHER SPECIFIED ABNORMAL UTERINE AND VAGINAL BLEEDING: ICD-10-CM

## 2023-12-21 DIAGNOSIS — D25.9 LEIOMYOMA OF UTERUS, UNSPECIFIED: ICD-10-CM

## 2023-12-21 PROCEDURE — 99214 OFFICE O/P EST MOD 30 MIN: CPT

## 2023-12-21 RX ORDER — METRONIDAZOLE 7.5 MG/G
0.75 GEL VAGINAL
Qty: 1 | Refills: 0 | Status: DISCONTINUED | COMMUNITY
Start: 2021-11-08 | End: 2023-12-21

## 2023-12-21 NOTE — DISCUSSION/SUMMARY
[FreeTextEntry1] : discuss sonogram preferably after a period.   She cannot have it after her next period since her hip surgery will prevent her from walking for four weeks.  Therefore she will have the sonogram in February

## 2023-12-21 NOTE — HISTORY OF PRESENT ILLNESS
[FreeTextEntry1] : staining LMP November 26    Menses are regular, last 4 days with 2 heavy   No recent changes    After one week of no bleeding she has had on and off staining and sometimes heavy bleeding.  Some cramping as well.   This has not happened in the past   She is having hip surgery next week

## 2024-04-02 ENCOUNTER — NON-APPOINTMENT (OUTPATIENT)
Age: 34
End: 2024-04-02

## 2024-04-10 ENCOUNTER — APPOINTMENT (OUTPATIENT)
Dept: OBGYN | Facility: CLINIC | Age: 34
End: 2024-04-10

## 2024-04-17 ENCOUNTER — APPOINTMENT (OUTPATIENT)
Dept: OBGYN | Facility: CLINIC | Age: 34
End: 2024-04-17
Payer: COMMERCIAL

## 2024-04-17 ENCOUNTER — ASOB RESULT (OUTPATIENT)
Age: 34
End: 2024-04-17

## 2024-04-17 VITALS — HEIGHT: 64 IN | SYSTOLIC BLOOD PRESSURE: 128 MMHG | DIASTOLIC BLOOD PRESSURE: 81 MMHG

## 2024-04-17 DIAGNOSIS — Z01.419 ENCOUNTER FOR GYNECOLOGICAL EXAMINATION (GENERAL) (ROUTINE) W/OUT ABNORMAL FINDINGS: ICD-10-CM

## 2024-04-17 PROCEDURE — 99395 PREV VISIT EST AGE 18-39: CPT

## 2024-04-17 PROCEDURE — 76830 TRANSVAGINAL US NON-OB: CPT

## 2024-04-17 PROCEDURE — 99459 PELVIC EXAMINATION: CPT

## 2024-04-17 NOTE — PHYSICAL EXAM
[Chaperone Present] : A chaperone was present in the examining room during all aspects of the physical examination [24163] : A chaperone was present during the pelvic exam. [Appropriately responsive] : appropriately responsive [Alert] : alert [No Acute Distress] : no acute distress [No Lymphadenopathy] : no lymphadenopathy [Regular Rate Rhythm] : regular rate rhythm [No Murmurs] : no murmurs [Clear to Auscultation B/L] : clear to auscultation bilaterally [Soft] : soft [Non-tender] : non-tender [Non-distended] : non-distended [No HSM] : No HSM [No Lesions] : no lesions [No Mass] : no mass [Oriented x3] : oriented x3 [Examination Of The Breasts] : a normal appearance [No Masses] : no breast masses were palpable [Labia Majora] : normal [Labia Minora] : normal [Normal] : normal [Enlarged ___ wks] : enlarged [unfilled] ~Uweeks [Uterine Adnexae] : normal

## 2024-04-17 NOTE — HISTORY OF PRESENT ILLNESS
[FreeTextEntry1] : The patient is here for a routine gynecological examination with Pap smear.  Her LMP was 1 weeks ago   She has no recent gynecological or urinary problems  there is some increased PMS   Menstrual cycles are back to normal

## 2024-04-18 LAB — HPV HIGH+LOW RISK DNA PNL CVX: NOT DETECTED

## 2024-04-22 LAB — CYTOLOGY CVX/VAG DOC THIN PREP: ABNORMAL

## 2024-10-15 ENCOUNTER — APPOINTMENT (OUTPATIENT)
Dept: DERMATOLOGY | Facility: CLINIC | Age: 34
End: 2024-10-15
Payer: COMMERCIAL

## 2024-10-15 PROCEDURE — 99214 OFFICE O/P EST MOD 30 MIN: CPT

## 2024-12-17 ENCOUNTER — APPOINTMENT (OUTPATIENT)
Dept: DERMATOLOGY | Facility: CLINIC | Age: 34
End: 2024-12-17

## 2025-01-09 NOTE — ASU PATIENT PROFILE, ADULT - DOES PATIENT HAVE ADVANCE DIRECTIVE
[facility] Division of Hospital Medicine  Analia Leahy DO  Available via MS Teams  Pager: [phone]    Patient is a 87y old  Male who presents with a chief complaint of weakness (08 Jan 2025 16:07)      SUBJECTIVE / OVERNIGHT EVENTS:    Pt seen and examined this morning. Pt states he feels well today.     ADDITIONAL REVIEW OF SYSTEMS:  No Fever, chills, chest pain, shortness of breath.     MEDICATIONS  (STANDING):  apixaban 2.5 milliGRAM(s) Oral every 12 hours  bacitracin   Ointment 1 Application(s) Topical two times a day  carvedilol 6.25 milliGRAM(s) Oral every 12 hours  chlorhexidine 2% Cloths 1 Application(s) Topical daily  finasteride 5 milliGRAM(s) Oral daily  latanoprost 0.005% Ophthalmic Solution 1 Drop(s) Both EYES at bedtime  multivitamin 1 Tablet(s) Oral daily  oseltamivir 30 milliGRAM(s) Oral every 24 hours  pantoprazole    Tablet 40 milliGRAM(s) Oral every 12 hours  polyethylene glycol 3350 17 Gram(s) Oral two times a day  tamsulosin 0.4 milliGRAM(s) Oral at bedtime  traZODone 50 milliGRAM(s) Oral at bedtime    MEDICATIONS  (PRN):  acetaminophen     Tablet .. 650 milliGRAM(s) Oral every 6 hours PRN Temp greater or equal to 38C (100.4F), Mild Pain (1 - 3)  aluminum hydroxide/magnesium hydroxide/simethicone Suspension 30 milliLiter(s) Oral every 4 hours PRN Dyspepsia  LORazepam   Injectable 2 milliGRAM(s) IV Push once PRN Seizure  melatonin 3 milliGRAM(s) Oral at bedtime PRN Insomnia  ondansetron Injectable 4 milliGRAM(s) IV Push every 8 hours PRN Nausea and/or Vomiting      I&O's Summary      PHYSICAL EXAM:  Vital Signs Last 24 Hrs  T(C): 36.4 (09 Jan 2025 08:00), Max: 37.2 (08 Jan 2025 20:00)  T(F): 97.6 (09 Jan 2025 08:00), Max: 98.9 (08 Jan 2025 20:00)  HR: 81 (09 Jan 2025 08:00) (78 - 100)  BP: 138/96 (09 Jan 2025 08:00) (117/79 - 147/89)  BP(mean): --  RR: 18 (09 Jan 2025 08:00) (18 - 18)  SpO2: 100% (09 Jan 2025 08:00) (100% - 100%)    Parameters below as of 09 Jan 2025 08:00  Patient On (Oxygen Delivery Method): room air      CONSTITUTIONAL: NAD, eating breakfast  EYES: closed  ENMT: Moist oral mucosa  RESPIRATORY: Normal respiratory effort; lungs are clear to auscultation bilaterally  CARDIOVASCULAR: Regular rate and rhythm, normal S1 and S2, no murmur/rub/gallop  ABDOMEN: Nontender to palpation, normoactive bowel sounds  PSYCH: Alert      LABS:                        9.7    4.84  )-----------( 254      ( 08 Jan 2025 10:33 )             33.2     01-08    141  |  107  |  13  ----------------------------<  86  3.4[L]   |  25  |  0.99    Ca    9.3      08 Jan 2025 10:33  Phos  3.1     01-08  Mg     2.00     01-08            Urinalysis Basic - ( 08 Jan 2025 10:33 )    Color: x / Appearance: x / SG: x / pH: x  Gluc: 86 mg/dL / Ketone: x  / Bili: x / Urobili: x   Blood: x / Protein: x / Nitrite: x   Leuk Esterase: x / RBC: x / WBC x   Sq Epi: x / Non Sq Epi: x / Bacteria: x        SARS-CoV-2: Lanre (27 Nov 2024 13:52)       No